# Patient Record
Sex: FEMALE | Race: ASIAN | NOT HISPANIC OR LATINO
[De-identification: names, ages, dates, MRNs, and addresses within clinical notes are randomized per-mention and may not be internally consistent; named-entity substitution may affect disease eponyms.]

---

## 2020-07-08 ENCOUNTER — APPOINTMENT (OUTPATIENT)
Dept: NEUROLOGY | Facility: CLINIC | Age: 75
End: 2020-07-08
Payer: MEDICARE

## 2020-07-08 VITALS
DIASTOLIC BLOOD PRESSURE: 75 MMHG | SYSTOLIC BLOOD PRESSURE: 119 MMHG | OXYGEN SATURATION: 98 % | TEMPERATURE: 98 F | HEART RATE: 70 BPM | WEIGHT: 90 LBS | HEIGHT: 62 IN | BODY MASS INDEX: 16.56 KG/M2

## 2020-07-08 DIAGNOSIS — Z85.3 PERSONAL HISTORY OF MALIGNANT NEOPLASM OF BREAST: ICD-10-CM

## 2020-07-08 DIAGNOSIS — Z87.39 PERSONAL HISTORY OF OTHER DISEASES OF THE MUSCULOSKELETAL SYSTEM AND CONNECTIVE TISSUE: ICD-10-CM

## 2020-07-08 DIAGNOSIS — I83.93 ASYMPTOMATIC VARICOSE VEINS OF BILATERAL LOWER EXTREMITIES: ICD-10-CM

## 2020-07-08 DIAGNOSIS — Z86.69 PERSONAL HISTORY OF OTHER DISEASES OF THE NERVOUS SYSTEM AND SENSE ORGANS: ICD-10-CM

## 2020-07-08 DIAGNOSIS — Z78.9 OTHER SPECIFIED HEALTH STATUS: ICD-10-CM

## 2020-07-08 DIAGNOSIS — Z80.3 FAMILY HISTORY OF MALIGNANT NEOPLASM OF BREAST: ICD-10-CM

## 2020-07-08 DIAGNOSIS — Z82.49 FAMILY HISTORY OF ISCHEMIC HEART DISEASE AND OTHER DISEASES OF THE CIRCULATORY SYSTEM: ICD-10-CM

## 2020-07-08 DIAGNOSIS — Z86.59 PERSONAL HISTORY OF OTHER MENTAL AND BEHAVIORAL DISORDERS: ICD-10-CM

## 2020-07-08 PROBLEM — Z00.00 ENCOUNTER FOR PREVENTIVE HEALTH EXAMINATION: Status: ACTIVE | Noted: 2020-07-08

## 2020-07-08 PROCEDURE — 99205 OFFICE O/P NEW HI 60 MIN: CPT

## 2020-07-08 RX ORDER — IRBESARTAN 150 MG/1
150 TABLET ORAL
Refills: 0 | Status: ACTIVE | COMMUNITY

## 2020-07-08 RX ORDER — CHOLECALCIFEROL (VITAMIN D3) 25 MCG
TABLET ORAL
Refills: 0 | Status: ACTIVE | COMMUNITY

## 2020-07-08 RX ORDER — ASPIRIN 325 MG
TABLET ORAL
Refills: 0 | Status: ACTIVE | COMMUNITY

## 2020-07-08 RX ORDER — OLOPATADINE HYDROCHLORIDE 7 MG/ML
0.7 SOLUTION OPHTHALMIC
Refills: 0 | Status: ACTIVE | COMMUNITY

## 2020-07-08 RX ORDER — COLESEVELAM HYDROCHLORIDE 3.75 G/1
3.75 POWDER, FOR SUSPENSION ORAL
Refills: 0 | Status: ACTIVE | COMMUNITY

## 2020-07-08 RX ORDER — LATANOPROST/PF 0.005 %
0.01 DROPS OPHTHALMIC (EYE)
Refills: 0 | Status: ACTIVE | COMMUNITY

## 2020-07-08 RX ORDER — FOLIC ACID 1 MG/1
1 TABLET ORAL
Refills: 0 | Status: ACTIVE | COMMUNITY

## 2020-07-08 NOTE — REVIEW OF SYSTEMS
[As Noted in HPI] : as noted in HPI [Eyesight Problems] : eyesight problems [Joint Pain] : joint pain [Fever] : no fever [Anxiety] : no anxiety [Chest Pain] : no chest pain [Loss Of Hearing] : no hearing loss [Abdominal Pain] : no abdominal pain [Cough] : no cough [Itching] : no itching [Incontinence] : no incontinence [Muscle Weakness] : no muscle weakness [Easy Bleeding] : no tendency for easy bleeding

## 2020-07-08 NOTE — ASSESSMENT
[FreeTextEntry1] : Memory problems in patient with breast cancer and h/o hyponatremia, the symptoms are concerning for cognitive impairment but given her history should rule out metastasis from breast CA, metabolic dysfunction and other causes of reversible causes of dementia.  WIll check basic labs, labs for reversible cases of dementia and MRI brain with and w/o edgar.

## 2020-07-08 NOTE — HISTORY OF PRESENT ILLNESS
[FreeTextEntry1] : The patient is here for memory problems which started few years ago as per the son but were noted to be significantly worse over the past few  months with the COVID19 pandemic.  The patient has been also admitted to [facility] with worsening memory in the setting of hyponatremia.  Her imaging showed some concern for possible metastasis to the spine, patient has h/o of breast cancer, but MRI of the spine is reported to be normal as pe rthe son.  The patient was dx with breast cancer 7-8 years ago and has had normal mammograms as per the son.\par \par The patient lives with the son.  She also to get groomed, eat and do some cleaning by herself.  She has forgotten things on the stove.  She does not do the finances, has never done so.  She does go outside without any known times of getting lost but she has gotten confused.  She does not have any bowel or bladder problems, no problems with walking or mood.

## 2020-07-09 LAB
ALBUMIN SERPL ELPH-MCNC: 4.8 G/DL
ALP BLD-CCNC: 47 U/L
ALT SERPL-CCNC: 25 U/L
ANION GAP SERPL CALC-SCNC: 14 MMOL/L
AST SERPL-CCNC: 54 U/L
BASOPHILS # BLD AUTO: 0.04 K/UL
BASOPHILS NFR BLD AUTO: 0.6 %
BILIRUB SERPL-MCNC: 0.6 MG/DL
BUN SERPL-MCNC: 21 MG/DL
CALCIUM SERPL-MCNC: 9.7 MG/DL
CHLORIDE SERPL-SCNC: 97 MMOL/L
CO2 SERPL-SCNC: 24 MMOL/L
CREAT SERPL-MCNC: 0.79 MG/DL
EOSINOPHIL # BLD AUTO: 0.05 K/UL
EOSINOPHIL NFR BLD AUTO: 0.7 %
FOLATE SERPL-MCNC: >20 NG/ML
GLUCOSE SERPL-MCNC: 83 MG/DL
HCT VFR BLD CALC: 41.1 %
HGB BLD-MCNC: 13.9 G/DL
IMM GRANULOCYTES NFR BLD AUTO: 0.3 %
LYMPHOCYTES # BLD AUTO: 1.86 K/UL
LYMPHOCYTES NFR BLD AUTO: 26.8 %
MAN DIFF?: NORMAL
MCHC RBC-ENTMCNC: 33.3 PG
MCHC RBC-ENTMCNC: 33.8 GM/DL
MCV RBC AUTO: 98.6 FL
MONOCYTES # BLD AUTO: 0.33 K/UL
MONOCYTES NFR BLD AUTO: 4.7 %
NEUTROPHILS # BLD AUTO: 4.65 K/UL
NEUTROPHILS NFR BLD AUTO: 66.9 %
PLATELET # BLD AUTO: 251 K/UL
POTASSIUM SERPL-SCNC: 4.8 MMOL/L
PROT SERPL-MCNC: 7.6 G/DL
RBC # BLD: 4.17 M/UL
RBC # FLD: 13.5 %
SODIUM SERPL-SCNC: 134 MMOL/L
T PALLIDUM AB SER QL IA: NEGATIVE
T3 SERPL-MCNC: 93 NG/DL
T4 SERPL-MCNC: 8.6 UG/DL
TSH SERPL-ACNC: 1.63 UIU/ML
VIT B12 SERPL-MCNC: 1337 PG/ML
WBC # FLD AUTO: 6.95 K/UL

## 2020-07-14 LAB — METHYLMALONATE SERPL-SCNC: 277 NMOL/L

## 2020-08-26 ENCOUNTER — APPOINTMENT (OUTPATIENT)
Dept: NEUROLOGY | Facility: CLINIC | Age: 75
End: 2020-08-26
Payer: MEDICARE

## 2020-08-26 VITALS
HEIGHT: 62 IN | OXYGEN SATURATION: 98 % | TEMPERATURE: 97.6 F | DIASTOLIC BLOOD PRESSURE: 80 MMHG | WEIGHT: 110 LBS | SYSTOLIC BLOOD PRESSURE: 126 MMHG | HEART RATE: 76 BPM | BODY MASS INDEX: 20.24 KG/M2

## 2020-08-26 PROCEDURE — 99214 OFFICE O/P EST MOD 30 MIN: CPT

## 2020-08-26 NOTE — HISTORY OF PRESENT ILLNESS
[FreeTextEntry1] : The patient is here for memory problems which started few years ago as per the son but were noted to be significantly worse over the past few months with the COVID19 pandemic. The patient has been also admitted to [facility] with worsening memory in the setting of hyponatremia. Her imaging showed some concern for possible metastasis to the spine, patient has h/o of breast cancer, but MRI of the spine is reported to be normal as pe rthe son. The patient was dx with breast cancer 7-8 years ago and has had normal mammograms as per the son.\par \par The patient lives with the son. She also to get groomed, eat and do some cleaning by herself. She has forgotten things on the stove. She does not do the finances, has never done so. She does go outside without any known times of getting lost but she has gotten confused. She does not have any bowel or bladder problems, no problems with walking or mood. \par \par Memory has worsening since last visit.

## 2020-08-26 NOTE — ASSESSMENT
[FreeTextEntry1] : Memory problems in patient with breast cancer and h/o hyponatremia, the symptoms are concerning for cognitive impairment due to Alzheimers.  Will start Aricept 5mg.  Also, encouraged Mediterranean diet, physical/ social and intellectual stimulation.

## 2020-08-26 NOTE — DATA REVIEWED
[de-identified] : wmid [de-identified] : labs for reversible causes of dementia unremarkable, ast slightly elevated (pt will fu with PMD)

## 2020-08-26 NOTE — PHYSICAL EXAM
[General Appearance - Alert] : alert [General Appearance - In No Acute Distress] : in no acute distress [Person] : oriented to person [Place] : oriented to place [Registration Intact] : recent registration memory intact [Time] : disoriented to time [Concentration Intact] : normal concentrating ability [Naming Objects] : no difficulty naming common objects [Fluency] : fluency intact [Repeating Phrases] : no difficulty repeating a phrase [Comprehension] : comprehension intact [Vocabulary] : adequate range of vocabulary [Cranial Nerves Oculomotor (III)] : extraocular motion intact [Motor Tone] : muscle tone was normal in all four extremities [Motor Strength] : muscle strength was normal in all four extremities [Abnormal Walk] : normal gait [Balance] : balance was intact

## 2020-11-30 ENCOUNTER — APPOINTMENT (OUTPATIENT)
Dept: NEUROLOGY | Facility: CLINIC | Age: 75
End: 2020-11-30
Payer: MEDICARE

## 2020-11-30 VITALS
TEMPERATURE: 98 F | HEART RATE: 82 BPM | WEIGHT: 114 LBS | BODY MASS INDEX: 20.98 KG/M2 | DIASTOLIC BLOOD PRESSURE: 68 MMHG | OXYGEN SATURATION: 98 % | SYSTOLIC BLOOD PRESSURE: 115 MMHG | HEIGHT: 62 IN

## 2020-11-30 PROCEDURE — 99213 OFFICE O/P EST LOW 20 MIN: CPT

## 2020-11-30 NOTE — HISTORY OF PRESENT ILLNESS
[FreeTextEntry1] : The patient is here for memory problems which started few years ago as per the son but were noted to be significantly worse over the past few months with the COVID19 pandemic. The patient has been also admitted to [facility] with worsening memory in the setting of hyponatremia. Her imaging showed some concern for possible metastasis to the spine, patient has h/o of breast cancer, but MRI of the spine is reported to be normal as pe rthe son. The patient was dx with breast cancer 7-8 years ago and has had normal mammograms as per the son.\par \par The patient lives with the son. She also to get groomed, eat and do some cleaning by herself. She has forgotten things on the stove. She does not do the finances, has never done so. She does go outside without any known times of getting lost but she has gotten confused. She does not have any bowel or bladder problems, no problems with walking or mood. \par \par No clinical change since last visit. \par

## 2020-11-30 NOTE — ASSESSMENT
[FreeTextEntry1] : Memory problems in patient with breast cancer and h/o hyponatremia, the symptoms are concerning for cognitive impairment due to Alzheimers. Will increase Aricept 5mg to 10mg. Also, encouraged Mediterranean diet, physical/ social and intellectual stimulation. \par

## 2020-11-30 NOTE — PHYSICAL EXAM
[General Appearance - Alert] : alert [General Appearance - In No Acute Distress] : in no acute distress [Person] : oriented to person [Place] : oriented to place [Time] : disoriented to time [Concentration Intact] : normal concentrating ability [Naming Objects] : no difficulty naming common objects [Repeating Phrases] : no difficulty repeating a phrase [Fluency] : fluency intact [Comprehension] : comprehension intact [Cranial Nerves Optic (II)] : visual acuity intact bilaterally,  visual fields full to confrontation, pupils equal round and reactive to light [Cranial Nerves Oculomotor (III)] : extraocular motion intact [Cranial Nerves Trigeminal (V)] : facial sensation intact symmetrically [Cranial Nerves Facial (VII)] : face symmetrical [Motor Tone] : muscle tone was normal in all four extremities [Motor Strength] : muscle strength was normal in all four extremities [Sensation Tactile Decrease] : light touch was intact [Abnormal Walk] : normal gait [Balance] : balance was intact

## 2021-03-15 ENCOUNTER — APPOINTMENT (OUTPATIENT)
Dept: NEUROLOGY | Facility: CLINIC | Age: 76
End: 2021-03-15
Payer: MEDICARE

## 2021-03-15 VITALS
SYSTOLIC BLOOD PRESSURE: 157 MMHG | HEART RATE: 86 BPM | OXYGEN SATURATION: 98 % | WEIGHT: 114 LBS | DIASTOLIC BLOOD PRESSURE: 75 MMHG | TEMPERATURE: 97.3 F | HEIGHT: 62 IN | BODY MASS INDEX: 20.98 KG/M2

## 2021-03-15 PROCEDURE — 99072 ADDL SUPL MATRL&STAF TM PHE: CPT

## 2021-03-15 PROCEDURE — 99213 OFFICE O/P EST LOW 20 MIN: CPT

## 2021-03-15 RX ORDER — DONEPEZIL HYDROCHLORIDE 5 MG/1
5 TABLET ORAL
Qty: 30 | Refills: 5 | Status: DISCONTINUED | COMMUNITY
Start: 2020-08-26 | End: 2021-03-15

## 2021-03-15 NOTE — PHYSICAL EXAM
[General Appearance - Alert] : alert [General Appearance - In No Acute Distress] : in no acute distress [Person] : oriented to person [Place] : oriented to place [Time] : oriented to time [Concentration Intact] : normal concentrating ability [Naming Objects] : no difficulty naming common objects [Fluency] : fluency intact [Comprehension] : comprehension intact [Vocabulary] : adequate range of vocabulary [Abnormal Walk] : normal gait [Balance] : balance was intact

## 2021-03-15 NOTE — ASSESSMENT
[FreeTextEntry1] : Memory problems in patient with breast cancer and h/o hyponatremia, the symptoms are concerning for cognitive impairment due to Alzheimers. Will cw Aricept 10mg. Also, encouraged Mediterranean diet, physical/ social and intellectual stimulation. \par \par HTN, will fu with PMD\par

## 2021-03-15 NOTE — HISTORY OF PRESENT ILLNESS
[FreeTextEntry1] : The patient is here for memory problems which started few years ago as per the son but were noted to be significantly worse over the past few months with the COVID19 pandemic. The patient has been also admitted to [facility] with worsening memory in the setting of hyponatremia. Her imaging showed some concern for possible metastasis to the spine, patient has h/o of breast cancer, but MRI of the spine is reported to be normal as pe rthe son. The patient was dx with breast cancer 7-8 years ago and has had normal mammograms as per the son.\par \par The patient lives with the son. She also to get groomed, eat and do some cleaning by herself. She has forgotten things on the stove. She does not do the finances, has never done so. She does go outside without any known times of getting lost but she has gotten confused. She does not have any bowel or bladder problems, no problems with walking or mood. \par \par The patient was started on Aricept and increased to 10mg, patient tolerating the medications well without any problems.  She is with psychotherapy and is getting help with the memory with games/ puzzles.\par

## 2021-09-15 ENCOUNTER — APPOINTMENT (OUTPATIENT)
Dept: NEUROLOGY | Facility: CLINIC | Age: 76
End: 2021-09-15
Payer: MEDICARE

## 2021-09-15 VITALS
SYSTOLIC BLOOD PRESSURE: 117 MMHG | OXYGEN SATURATION: 98 % | DIASTOLIC BLOOD PRESSURE: 74 MMHG | TEMPERATURE: 97.3 F | HEART RATE: 74 BPM | HEIGHT: 62 IN | BODY MASS INDEX: 20.98 KG/M2 | WEIGHT: 114 LBS

## 2021-09-15 PROCEDURE — 99213 OFFICE O/P EST LOW 20 MIN: CPT

## 2021-09-15 NOTE — HISTORY OF PRESENT ILLNESS
[FreeTextEntry1] : The patient is here for memory problems which started few years ago as per the son but were noted to be significantly worse over the past few months with the COVID19 pandemic. The patient has been also admitted to [facility] with worsening memory in the setting of hyponatremia. Her imaging showed some concern for possible metastasis to the spine, patient has h/o of breast cancer, but MRI of the spine is reported to be normal as per the son. The patient was dx with breast cancer 7-8 years ago and has had normal mammograms as per the son.\par \par The patient lives with the son. She also to get groomed, eat and do some cleaning by herself. She has forgotten things on the stove. She does not do the finances, has never done so. She does go outside without any known times of getting lost but she has gotten confused. She does not have any bowel or bladder problems, no problems with walking or mood. \par \par The patient was started on Aricept and increased to 10mg, patient tolerating the medications well without any problems. She is with psychotherapy and is getting help with the memory with games/ puzzles.  Sicne last visit, the patient is getting HHA 6 hours per day as well.\par

## 2021-09-15 NOTE — ASSESSMENT
[FreeTextEntry1] : Memory problems in patient with breast cancer and h/o hyponatremia, the symptoms are concerning for mild cognitive impairment, patient initially scored MMSE of 25/30 in 2020 and the MMSE score 9/15/2021 is 27/30 which is in the normal range.  The patient is doing well and tolerating the Aricept well without any problems.  The patient is also on Mirtazapine, dose unknown, the son will obtain information on the medication dose in order to send Rx for it.  Advised patient's son on the importance of Mediterranean diet, 120min/ week of aerobic activity and social and intellectual stimulation in maintaining good cognitive reserve.\par \par I spent the time noted on the day of this patient encounter preparing for, providing and documenting the above E/M service and counseling and educate patient on differential, workup, disease course, and treatment/management. Education was provided to the patient during this encounter. All questions and concerns were answered and addressed in detail. The patient verbalized understanding and agreed to plan. Patient was advised to continue to monitor for neurologic symptoms and to notify my office or go to the nearest emergency room if there are any changes. Any orders/referrals and communications were provided as well. \par Side effects of the above medications were discussed in detail including but not limited to applicable black box warning and teratogenicity as appropriate. \par Patient was advised to bring previous records to my office, including CD of imaging, when applicable. \par \par

## 2021-09-15 NOTE — PHYSICAL EXAM
[General Appearance - Alert] : alert [General Appearance - In No Acute Distress] : in no acute distress [Person] : oriented to person [Place] : oriented to place [Time] : oriented to time [Concentration Intact] : normal concentrating ability [Naming Objects] : no difficulty naming common objects [Fluency] : fluency intact [Comprehension] : comprehension intact [Vocabulary] : adequate range of vocabulary [Total Score ___ / 30] : the patient achieved a score of [unfilled] /30 [Place ___ / 5] : place [unfilled] / 5 [Registration ___ / 3] : registration [unfilled] / 3 [Serial Sevens ___/5] : serial sevens [unfilled] / 5 [Naming 2 Objects ___ / 2] : naming two objects [unfilled] / 2 [Repeating a Sentence ___ / 1] : repeating a sentence [unfilled] / 1 [Writing a Sentence ___ / 1] : write sentence [unfilled] / 1 [3-stage Verbal Command ___ / 3] : three-stage verbal command [unfilled] / 3 [Written Command ___ / 1] : written command [unfilled] / 1 [Copy a Design ___ / 1] : copy a design [unfilled] / 1 [Recall ___ / 3] : recall [unfilled] / 3 [Cranial Nerves Optic (II)] : visual acuity intact bilaterally,  visual fields full to confrontation, pupils equal round and reactive to light [Cranial Nerves Oculomotor (III)] : extraocular motion intact [Cranial Nerves Trigeminal (V)] : facial sensation intact symmetrically [Cranial Nerves Facial (VII)] : face symmetrical [Motor Tone] : muscle tone was normal in all four extremities [Motor Strength] : muscle strength was normal in all four extremities [Sensation Tactile Decrease] : light touch was intact [Abnormal Walk] : normal gait [Balance] : balance was intact [Short Term Intact] : short term memory impaired [Coordination - Dysmetria Impaired Finger-to-Nose Bilateral] : not present [Coordination - Dysmetria Impaired Heel-to-Shin Bilateral] : not present [FreeTextEntry4] : MMSE 27/30

## 2022-03-16 ENCOUNTER — RX RENEWAL (OUTPATIENT)
Age: 77
End: 2022-03-16

## 2022-03-23 ENCOUNTER — APPOINTMENT (OUTPATIENT)
Dept: NEUROLOGY | Facility: CLINIC | Age: 77
End: 2022-03-23
Payer: MEDICARE

## 2022-03-23 VITALS
HEART RATE: 74 BPM | OXYGEN SATURATION: 97 % | TEMPERATURE: 97.6 F | SYSTOLIC BLOOD PRESSURE: 112 MMHG | BODY MASS INDEX: 23.92 KG/M2 | DIASTOLIC BLOOD PRESSURE: 62 MMHG | HEIGHT: 62 IN | WEIGHT: 130 LBS

## 2022-03-23 PROCEDURE — 99213 OFFICE O/P EST LOW 20 MIN: CPT

## 2022-03-23 NOTE — ASSESSMENT
[FreeTextEntry1] : Memory problems in patient with breast cancer and h/o hyponatremia, the symptoms are concerning for mild cognitive impairment, patient initially scored MMSE of 25/30 in 2020 and the MMSE score 9/15/2021 is 27/30 which is in the normal range. The patient is doing well and tolerating the Aricept well without any problems. The patient is also on Mirtazapine, dose unknown, the son will obtain information on the medication dose in order to send Rx for it. Advised patient's son on the importance of Mediterranean diet, 120min/ week of aerobic activity and social and intellectual stimulation in maintaining good cognitive reserve.\par \par I spent the time noted on the day of this patient encounter preparing for, providing and documenting the above E/M service and counseling and educate patient on differential, workup, disease course, and treatment/management. Education was provided to the patient during this encounter. All questions and concerns were answered and addressed in detail. The patient verbalized understanding and agreed to plan. Patient was advised to continue to monitor for neurologic symptoms and to notify my office or go to the nearest emergency room if there are any changes. Any orders/referrals and communications were provided as well. \par Side effects of the above medications were discussed in detail including but not limited to applicable black box warning and teratogenicity as appropriate. \par Patient was advised to bring previous records to my office, including CD of imaging, when applicable. \par \par  \par \par

## 2022-03-23 NOTE — HISTORY OF PRESENT ILLNESS
[FreeTextEntry1] : The patient is here for memory problems which started few years ago as per the son but were noted to be significantly worse over the past few months with the COVID19 pandemic. The patient has been also admitted to [facility] with worsening memory in the setting of hyponatremia. Her imaging showed some concern for possible metastasis to the spine, patient has h/o of breast cancer, but MRI of the spine is reported to be normal as per the son. The patient was dx with breast cancer 7-8 years ago and has had normal mammograms as per the son.\par \par The patient lives with the son. She also to get groomed, eat and do some cleaning by herself. She has forgotten things on the stove. She does not do the finances, has never done so. She does go outside without any known times of getting lost but she has gotten confused. She does not have any bowel or bladder problems, no problems with walking or mood. \par \par The patient was started on Aricept and increased to 10mg, patient tolerating the medications well without any problems. She is with psychotherapy and is getting help with the memory with games/ puzzles. The patient is getting HHA 6 hours per day as well.\par \par

## 2022-03-23 NOTE — PHYSICAL EXAM
[General Appearance - Alert] : alert [General Appearance - In No Acute Distress] : in no acute distress [Person] : oriented to person [Place] : oriented to place [Time] : oriented to time [Concentration Intact] : a decrease in concentrating ability was observed [Naming Objects] : no difficulty naming common objects [Fluency] : fluency intact [Comprehension] : comprehension intact [Vocabulary] : adequate range of vocabulary [Abnormal Walk] : normal gait [Balance] : balance was intact

## 2022-09-16 ENCOUNTER — APPOINTMENT (OUTPATIENT)
Dept: NEUROLOGY | Facility: CLINIC | Age: 77
End: 2022-09-16

## 2022-09-16 VITALS
DIASTOLIC BLOOD PRESSURE: 85 MMHG | HEIGHT: 62 IN | OXYGEN SATURATION: 98 % | BODY MASS INDEX: 24.29 KG/M2 | WEIGHT: 132 LBS | HEART RATE: 73 BPM | TEMPERATURE: 98.4 F | SYSTOLIC BLOOD PRESSURE: 137 MMHG

## 2022-09-16 DIAGNOSIS — G31.84 MILD COGNITIVE IMPAIRMENT, SO STATED: ICD-10-CM

## 2022-09-16 PROCEDURE — 99214 OFFICE O/P EST MOD 30 MIN: CPT

## 2022-09-16 NOTE — PHYSICAL EXAM
[General Appearance - Alert] : alert [General Appearance - In No Acute Distress] : in no acute distress [Date / Time ___ / 5] : date / time [unfilled] / 5 [Place ___ / 5] : place [unfilled] / 5 [Total Score ___ / 30] : the patient achieved a score of [unfilled] /30 [Registration ___ / 3] : registration [unfilled] / 3 [Serial Sevens ___/5] : serial sevens [unfilled] / 5 [Naming 2 Objects ___ / 2] : naming two objects [unfilled] / 2 [Repeating a Sentence ___ / 1] : repeating a sentence [unfilled] / 1 [Writing a Sentence ___ / 1] : write sentence [unfilled] / 1 [3-stage Verbal Command ___ / 3] : three-stage verbal command [unfilled] / 3 [Written Command ___ / 1] : written command [unfilled] / 1 [Copy a Design ___ / 1] : copy a design [unfilled] / 1 [Recall ___ / 3] : recall [unfilled] / 3

## 2022-09-16 NOTE — HISTORY OF PRESENT ILLNESS
[FreeTextEntry1] : The patient is here for memory problems which started few years ago as per the son but were noted to be significantly worse over the past few months with the COVID19 pandemic. The patient has been also admitted to [facility] with worsening memory in the setting of hyponatremia. Her imaging showed some concern for possible metastasis to the spine, patient has h/o of breast cancer, but MRI of the spine is reported to be normal as per the son. The patient was dx with breast cancer 7-8 years ago and has had normal mammograms as per the son.\par \par The patient lives with the son. She also to get groomed, eat and do some cleaning by herself. She has forgotten things on the stove. She does not do the finances, has never done so. She does go outside without any known times of getting lost but she has gotten confused. She does not have any bowel or bladder problems, no problems with walking or mood. \par \par The patient was started on Aricept and increased to 10mg, patient tolerating the medications well without any problems. She is with psychotherapy and is getting help with the memory with games/ puzzles. The patient is getting HHA 6 hours per day as well.\par \par Memory fluctuates since last visit.\par

## 2022-09-16 NOTE — ASSESSMENT
[FreeTextEntry1] : Memory problems in patient with breast cancer and h/o hyponatremia, the symptoms are concerning for mild cognitive impairment, patient initially scored MMSE of 25/30 in 2020 and the MMSE score 9/15/2021 is 27/30 and 21/30 on 9/16/2022. The patient is doing well and tolerating the Aricept well without any problems but the memory loss in progressing. The patient is also on Mirtazapine, dose unknown, the son will obtain information on the medication dose in order to send Rx for it. Advised patient's son on the importance of Mediterranean diet, 120min/ week of aerobic activity and social and intellectual stimulation in maintaining good cognitive reserve.\par \par HTN, stable, fu with PMD\par \par I spent the time noted on the day of this patient encounter preparing for, providing and documenting the above E/M service and counseling and educate patient on differential, workup, disease course, and treatment/management. Education was provided to the patient during this encounter. All questions and concerns were answered and addressed in detail. The patient verbalized understanding and agreed to plan. Patient was advised to continue to monitor for neurologic symptoms and to notify my office or go to the nearest emergency room if there are any changes. Any orders/referrals and communications were provided as well. \par Side effects of the above medications were discussed in detail including but not limited to applicable black box warning and teratogenicity as appropriate. \par Patient was advised to bring previous records to my office, including CD of imaging, when applicable.

## 2023-03-24 ENCOUNTER — APPOINTMENT (OUTPATIENT)
Dept: NEUROLOGY | Facility: CLINIC | Age: 78
End: 2023-03-24
Payer: MEDICARE

## 2023-03-24 VITALS
TEMPERATURE: 97.4 F | HEIGHT: 62 IN | DIASTOLIC BLOOD PRESSURE: 69 MMHG | OXYGEN SATURATION: 97 % | SYSTOLIC BLOOD PRESSURE: 114 MMHG | BODY MASS INDEX: 24.11 KG/M2 | WEIGHT: 131 LBS | HEART RATE: 77 BPM

## 2023-03-24 PROCEDURE — 99214 OFFICE O/P EST MOD 30 MIN: CPT

## 2023-03-24 RX ORDER — MEMANTINE HYDROCHLORIDE 5 MG/1
5 TABLET, FILM COATED ORAL
Qty: 120 | Refills: 5 | Status: DISCONTINUED | COMMUNITY
Start: 2022-09-16 | End: 2023-03-24

## 2023-03-24 RX ORDER — DONEPEZIL HYDROCHLORIDE 10 MG/1
10 TABLET ORAL DAILY
Qty: 30 | Refills: 5 | Status: DISCONTINUED | COMMUNITY
Start: 2020-09-21 | End: 2023-03-24

## 2023-03-24 NOTE — PHYSICAL EXAM
[General Appearance - Alert] : alert [General Appearance - In No Acute Distress] : in no acute distress [Person] : oriented to person [Concentration Intact] : normal concentrating ability [Naming Objects] : no difficulty naming common objects [Fluency] : fluency intact [Comprehension] : comprehension intact

## 2023-03-24 NOTE — ASSESSMENT
[FreeTextEntry1] : Memory problems in patient with breast cancer and h/o hyponatremia, the symptoms are concerning for mild cognitive impairment, patient initially scored MMSE of 25/30 in 2020 and the MMSE score 9/15/2021 is 27/30 and 21/30 on 9/16/2022. The patient is doing well and tolerating the Aricept well without any problems but the memory loss in progressing. The patient is also on Mirtazapine 10 mg at night. \par \par Patient is also experiencing insomnia, will advised patient to try melatonin 1 to 5 mg at night in stepwise fashion on consistent basis to be given and regular sleep schedule time in the evening.  She could also try a glass of warm milk 1 hour before bedtime.  Both the melatonin and the warm milk can assist with sleep.\par \par Advised patient's son on the importance of Mediterranean diet, 120min/ week of aerobic activity and social and intellectual stimulation in maintaining good cognitive reserve.\par \par HTN, better controlled at this visit, patient will follow-up with PMD.\par \par I spent the time noted on the day of this patient encounter preparing for, providing and documenting the above E/M service and counseling and educate patient on differential, workup, disease course, and treatment/management. Education was provided to the patient during this encounter. All questions and concerns were answered and addressed in detail. The patient verbalized understanding and agreed to plan. Patient was advised to continue to monitor for neurologic symptoms and to notify my office or go to the nearest emergency room if there are any changes. Any orders/referrals and communications were provided as well. \par Side effects of the above medications were discussed in detail including but not limited to applicable black box warning and teratogenicity as appropriate. \par Patient was advised to bring previous records to my office, including CD of imaging, when applicable. \par \par

## 2023-03-24 NOTE — HISTORY OF PRESENT ILLNESS
[FreeTextEntry1] : The patient is here for memory problems which started few years ago as per the son but were noted to be significantly worse over the past few months with the COVID19 pandemic. The patient has been also admitted to [facility] with worsening memory in the setting of hyponatremia. Her imaging showed some concern for possible metastasis to the spine, patient has h/o of breast cancer, but MRI of the spine is reported to be normal as per the son. The patient was dx with breast cancer 7-8 years ago and has had normal mammograms as per the son.\par \par The patient lives with the son. She also to get groomed, eat and do some cleaning by herself. She has forgotten things on the stove. She does not do the finances, has never done so. She does go outside without any known times of getting lost but she has gotten confused. She does not have any bowel or bladder problems, no problems with walking or mood. \par \par The patient was started on Aricept and increased to 10mg, patient tolerating the medications well without any problems. She is with psychotherapy and is getting help with the memory with games/ puzzles. The patient is getting HHA 12 hours per day as well.\par \par She was started on Namenda but had nausea, confusion and headaches and was stopped.\par HAs trouble with sleeping.\par \par

## 2023-09-21 ENCOUNTER — NON-APPOINTMENT (OUTPATIENT)
Age: 78
End: 2023-09-21

## 2023-09-26 ENCOUNTER — APPOINTMENT (OUTPATIENT)
Dept: NEUROLOGY | Facility: CLINIC | Age: 78
End: 2023-09-26
Payer: MEDICARE

## 2023-09-26 VITALS
SYSTOLIC BLOOD PRESSURE: 154 MMHG | WEIGHT: 129 LBS | BODY MASS INDEX: 23.74 KG/M2 | DIASTOLIC BLOOD PRESSURE: 90 MMHG | TEMPERATURE: 97.7 F | HEART RATE: 75 BPM | OXYGEN SATURATION: 96 % | HEIGHT: 62 IN

## 2023-09-26 VITALS — DIASTOLIC BLOOD PRESSURE: 71 MMHG | SYSTOLIC BLOOD PRESSURE: 111 MMHG

## 2023-09-26 DIAGNOSIS — M25.512 PAIN IN RIGHT SHOULDER: ICD-10-CM

## 2023-09-26 DIAGNOSIS — M25.511 PAIN IN RIGHT SHOULDER: ICD-10-CM

## 2023-09-26 DIAGNOSIS — G89.29 PAIN IN RIGHT SHOULDER: ICD-10-CM

## 2023-09-26 DIAGNOSIS — I10 ESSENTIAL (PRIMARY) HYPERTENSION: ICD-10-CM

## 2023-09-26 PROCEDURE — 99215 OFFICE O/P EST HI 40 MIN: CPT

## 2023-11-16 ENCOUNTER — APPOINTMENT (OUTPATIENT)
Dept: NEUROLOGY | Facility: CLINIC | Age: 78
End: 2023-11-16
Payer: MEDICARE

## 2023-11-16 VITALS
OXYGEN SATURATION: 95 % | SYSTOLIC BLOOD PRESSURE: 135 MMHG | HEIGHT: 61 IN | BODY MASS INDEX: 24.55 KG/M2 | TEMPERATURE: 98.2 F | WEIGHT: 130 LBS | HEART RATE: 83 BPM | DIASTOLIC BLOOD PRESSURE: 85 MMHG

## 2023-11-16 VITALS — SYSTOLIC BLOOD PRESSURE: 122 MMHG | DIASTOLIC BLOOD PRESSURE: 78 MMHG

## 2023-11-16 PROCEDURE — 99215 OFFICE O/P EST HI 40 MIN: CPT

## 2023-11-30 ENCOUNTER — OUTPATIENT (OUTPATIENT)
Dept: OUTPATIENT SERVICES | Facility: HOSPITAL | Age: 78
LOS: 1 days | End: 2023-11-30
Payer: MEDICARE

## 2023-11-30 DIAGNOSIS — Z98.49 CATARACT EXTRACTION STATUS, UNSPECIFIED EYE: Chronic | ICD-10-CM

## 2023-11-30 DIAGNOSIS — Z98.89 OTHER SPECIFIED POSTPROCEDURAL STATES: Chronic | ICD-10-CM

## 2023-11-30 DIAGNOSIS — R41.0 DISORIENTATION, UNSPECIFIED: ICD-10-CM

## 2023-11-30 DIAGNOSIS — Z90.11 ACQUIRED ABSENCE OF RIGHT BREAST AND NIPPLE: Chronic | ICD-10-CM

## 2023-11-30 PROCEDURE — 95957 EEG DIGITAL ANALYSIS: CPT

## 2023-11-30 PROCEDURE — 95816 EEG AWAKE AND DROWSY: CPT

## 2023-11-30 PROCEDURE — 95816 EEG AWAKE AND DROWSY: CPT | Mod: 26

## 2023-11-30 NOTE — EEG REPORT - NS EEG TEXT BOX
BRAD HIDALGO [id number]     Study Date: 11-30-23  Duration: 30 min  --------------------------------------------------------------------------------------------------  History:  CC/ HPI Patient is a 78y old  Female who presents with a chief complaint of   MEDICATIONS  (STANDING):    --------------------------------------------------------------------------------------------------  Study Interpretation:    [[[Abbreviation Key:  PDR=alpha rhythm/posterior dominant rhythm. A-P=anterior posterior.  Amplitude: ‘very low’:<20; ‘low’:20-49; ‘medium’:; ‘high’:>150uV.  Persistence for periodic/rhythmic patterns (% of epoch) ‘rare’:<1%; ‘occasional’:1-10%; ‘frequent’:10-50%; ‘abundant’:50-90%; ‘continuous’:>90%.  Persistence for sporadic discharges: ‘rare’:<1/hr; ‘occasional’:1/min-1/hr; ‘frequent’:>1/min; ‘abundant’:>1/10 sec.  RPP=rhythmic and periodic patterns; GRDA=generalized rhythmic delta activity; FIRDA=frontal intermittent GRDA; LRDA=lateralized rhythmic delta activity; TIRDA=temporal intermittent rhythmic delta activity;  LPD=PLED=lateralized periodic discharges; GPD=generalized periodic discharges; BIPDs =bilateral independent periodic discharges; Mf=multifocal; SIRPDs=stimulus induced rhythmic, periodic, or ictal appearing discharges; BIRDs=brief potentially ictal rhythmic discharges >4 Hz, lasting .5-10s; PFA (paroxysmal bursts >13 Hz or =8 Hz <10s).  Modifiers: +F=with fast component; +S=with spike component; +R=with rhythmic component.  S-B=burst suppression pattern.  Max=maximal. N1-drowsy; N2-stage II sleep; N3-slow wave sleep. SSS/BETS=small sharp spikes/benign epileptiform transients of sleep. HV=hyperventilation; PS=photic stimulation]]]    Daily EEG Visual Analysis    FINDINGS:      Background:  Continuity: Continuous  Symmetry: Symmetric  Posterior dominant rhythm (PDR): 11 Hz, reactive to eye closure. Symmetric low-amplitude frontal beta in wakefulness.  State Change: Present  Voltage: Normal  Anterior-Posterior Gradient: Present  Other background findings: Diffuse low-amplitude beta activity.  Breach: Absent    Background Slowing:  Generalized slowing:   Focal slowing: Occasional bilateral independent frontotemporal focal polymorphic delta slowing    State Changes:   Drowsiness is characterized by fragmentation, attenuation, and slowing of the background activity.  Stage 2 sleep is characterized by symmetric vertex waves, K complexes and sleep spindles.     Interictal Findings:  Occasional left frontotemporal (F7/T7) spike/sharp-wave discharges.  Occasional right frontotemporal (F8) sharp-wave discharges, less prevalent and less well formed than on the left.  Rare right frontal (F4) sharply contoured waveform.    Electrographic and Electroclinical seizures:  None    Other Clinical Events:  None    Activation Procedures:   Hyperventilation is not performed.    Photic stimulation is performed and does not elicit any abnormalities.      Artifacts:  Intermittent myogenic and movement artifacts are present.    EKG:  Single-lead EKG shows regular rhythm.    EEG Classification / Summary:  Abnormal routine EEG in the awake, drowsy, and asleep states.   -Occasional left > right frontotemporal spike/sharp-wave discharges.  -Occasional bilateral independent frontotemporal focal slowing.  -No electrographic seizures are captured.     Clinical Impression:  -Risk of focal-onset seizures from the left > right frontotemporal regions  -Bilateral frontotemporal focal cerebral dysfunction can be structural or functional in etiology.             -------------------------------------------------------------------------------------------------------  [facility] EEG Reading Room Ph#: [phone]  Epilepsy Answering Service after 5PM and before 8:30AM: Ph#: [phone]    Mattie Hannah MD  Attending Physician, [facility]ilepsy [place]

## 2024-01-29 ENCOUNTER — APPOINTMENT (OUTPATIENT)
Dept: NEUROLOGY | Facility: CLINIC | Age: 79
End: 2024-01-29

## 2024-01-30 ENCOUNTER — APPOINTMENT (OUTPATIENT)
Dept: NEUROLOGY | Facility: CLINIC | Age: 79
End: 2024-01-30

## 2024-02-01 ENCOUNTER — APPOINTMENT (OUTPATIENT)
Dept: NEUROLOGY | Facility: CLINIC | Age: 79
End: 2024-02-01
Payer: MEDICARE

## 2024-02-01 DIAGNOSIS — R41.0 DISORIENTATION, UNSPECIFIED: ICD-10-CM

## 2024-02-01 PROCEDURE — 99443: CPT

## 2024-02-01 RX ORDER — CHOLECALCIFEROL (VITAMIN D3) 25 MCG
25 MCG CAPSULE ORAL
Qty: 30 | Refills: 0 | Status: ACTIVE | COMMUNITY
Start: 2023-07-19

## 2024-02-01 RX ORDER — ASPIRIN 81 MG/1
81 TABLET, COATED ORAL
Qty: 30 | Refills: 0 | Status: ACTIVE | COMMUNITY
Start: 2023-07-19

## 2024-02-01 NOTE — HISTORY OF PRESENT ILLNESS
[FreeTextEntry1] : The patient is here for memory problems which started few years ago as per the son but were noted to be significantly worse over the past few months with the COVID19 pandemic. The patient has been also admitted to [facility] with worsening memory in the setting of hyponatremia. Her imaging showed some concern for possible metastasis to the spine, patient has h/o of breast cancer, but MRI of the spine is reported to be normal as per the son. The patient was dx with breast cancer 7-8 years ago and has had normal mammograms as per the son.    The patient lives with the son. She also to get groomed, eat and do some cleaning by herself. She has forgotten things on the stove. She does not do the finances, has never done so. She does go outside without any known times of getting lost but she has gotten confused. She does not have any bowel or bladder problems, no problems with walking or mood.    The patient was started on Aricept and increased to 10mg, patient tolerating the medications well without any problems. She is with psychotherapy and is getting help with the memory with games/ puzzles. The patient is getting HHA 12 hours per day as well.    She was started on Namenda but had nausea, confusion and headaches and was stopped.  Patient has been since last visit, in terms of her memory. As per the son, the patient's mood has overall worsened and she is sometimes becomes aggressive and agitated, usually not severe. The patient does not want to get involved in social activities.  Patient is here for episode of confusion when after long walk. She had clinching of her hand at that time and was sweathing. SOn concerned for cva/ sz.

## 2024-02-01 NOTE — REASON FOR VISIT
[Home] : at home, [unfilled] , at the time of the educational consult. [Medical Office: ([facility])___] : at the medical office located in  [This encounter was initiated by telehealth (audio with video) and converted to telephone (audio only) due to technical difficulties.] : This encounter was initiated by telehealth (audio with video) and converted to telephone (audio only) due to technical difficulties. [Follow-Up: _____] : a [unfilled] follow-up visit

## 2024-02-01 NOTE — ASSESSMENT
[FreeTextEntry1] : Episode of confusion a few weeks ago, resolved and has not reoccurred unremarkable MRi brain, left> right frontotemporal sharp wave discharges on EEG, will refer to epilepsy  Memory problems in patient with breast cancer and h/o hyponatremia, the symptoms are concerning for mild cognitive impairment, patient initially scored MMSE of 25/30 in 2020 and the MMSE score 9/15/2021 is 27/30 and 21/30 on 9/16/2022. The patient is doing well and tolerating the Aricept well without any problems but the memory loss in progressing. The patient is also on Mirtazapine 15 mg at night. Patient's son declines increasing the nitrazepam for better mood and sleep control.  Patient is also experiencing insomnia, will advised patient to try melatonin 1 to 5 mg at night in stepwise fashion on consistent basis to be given and regular sleep schedule time in the evening. 1-3 tablets of 1 mg of melatonin at bedtime for sleep, patient's time with trying to increase to 3 mg at bedtime but incremental levels. She could also try a glass of warm milk 1 hour before bedtime. Both the melatonin and the warm milk can assist with sleep.  Advised patient's son on the importance of Mediterranean diet, 120min/ week of aerobic activity and social and intellectual stimulation in maintaining good cognitive reserve.  HTN, better controlled at this visit, patient will follow-up with PMD.  I spent the time noted on the day of this patient encounter preparing for, providing and documenting the above E/M service and counseling and educate patient on differential, workup, disease course, and treatment/management. Education was provided to the patient during this encounter. All questions and concerns were answered and addressed in detail. The patient verbalized understanding and agreed to plan. Patient was advised to continue to monitor for neurologic symptoms and to notify my office or go to the nearest emergency room if there are any changes. Any orders/referrals and communications were provided as well.  Side effects of the above medications were discussed in detail including but not limited to applicable black box warning and teratogenicity as appropriate.  Patient was advised to bring previous records to my office, including CD of imaging, when applicable.

## 2024-04-12 ENCOUNTER — APPOINTMENT (OUTPATIENT)
Dept: NEUROLOGY | Facility: CLINIC | Age: 79
End: 2024-04-12
Payer: MEDICARE

## 2024-04-12 DIAGNOSIS — G30.9 ALZHEIMER'S DISEASE, UNSPECIFIED: ICD-10-CM

## 2024-04-12 DIAGNOSIS — T17.308A UNSPECIFIED FOREIGN BODY IN LARYNX CAUSING OTHER INJURY, INITIAL ENCOUNTER: ICD-10-CM

## 2024-04-12 DIAGNOSIS — R25.9 UNSPECIFIED ABNORMAL INVOLUNTARY MOVEMENTS: ICD-10-CM

## 2024-04-12 DIAGNOSIS — R41.3 OTHER AMNESIA: ICD-10-CM

## 2024-04-12 DIAGNOSIS — F02.80 ALZHEIMER'S DISEASE, UNSPECIFIED: ICD-10-CM

## 2024-04-12 DIAGNOSIS — G47.00 INSOMNIA, UNSPECIFIED: ICD-10-CM

## 2024-04-12 PROCEDURE — 99443: CPT

## 2024-04-12 PROCEDURE — G2211 COMPLEX E/M VISIT ADD ON: CPT | Mod: NC,1L

## 2024-04-12 RX ORDER — MIRTAZAPINE 15 MG/1
15 TABLET, FILM COATED ORAL
Qty: 30 | Refills: 11 | Status: ACTIVE | COMMUNITY
Start: 2020-09-25 | End: 1900-01-01

## 2024-04-12 RX ORDER — LACTULOSE 10 G/15 ML
10 SOLUTION, ORAL ORAL
Refills: 0 | Status: DISCONTINUED | COMMUNITY
End: 2024-04-12

## 2024-04-12 RX ORDER — STANDARDIZED SENNA CONCENTRATE 8.6 MG/1
8.6 TABLET ORAL
Refills: 0 | Status: DISCONTINUED | COMMUNITY
End: 2024-04-12

## 2024-04-12 RX ORDER — DONEPEZIL HYDROCHLORIDE 10 MG/1
10 TABLET ORAL
Qty: 30 | Refills: 11 | Status: ACTIVE | COMMUNITY
Start: 2020-11-30 | End: 1900-01-01

## 2024-04-12 NOTE — HISTORY OF PRESENT ILLNESS
[FreeTextEntry1] : The patient is here for memory problems which started few years ago as per the son but were noted to be significantly worse over the past few months with the COVID19 pandemic. The patient has been also admitted to [facility] with worsening memory in the setting of hyponatremia. Her imaging showed some concern for possible metastasis to the spine, patient has h/o of breast cancer, but MRI of the spine is reported to be normal as per the son. The patient was dx with breast cancer 7-8 years ago and has had normal mammograms as per the son.  The patient lives on her own, has HHA and her son visits frequently. She needs help with grooming, cleaning, showering, most household and outdoor chores.  Needs help with eating, in terms of utensil use and chocking.  He uses pampers and pads when sleeps.  Uses cane due to toe fractures.    The patient was started on Aricept and increased to 10mg, patient tolerating the medications well without any problems. She is with psychotherapy and is getting help with the memory with games/ puzzles. The patient is getting HHA 12 hours per day as well.  She was started on Namenda but had nausea, confusion and headaches and was stopped.  Patient has been since last visit, in terms of her memory. As per the son, the patient's mood has overall worsened and she is sometimes becomes aggressive and agitated, usually not severe. The patient does not want to get involved in social activities.  Patient is here for episode of confusion when after long walk. She had clinching of her hand at that time and was sweathing. SOn concerned for cva/ sz.

## 2024-04-12 NOTE — ASSESSMENT
[FreeTextEntry1] : Episode of confusion a few weeks ago, resolved and has not reoccurred unremarkable MRi brain, left> right frontotemporal sharp wave discharges on EEG, will refer to epilepsy  Memory problems in patient with breast cancer and h/o hyponatremia, the symptoms are concerning for mild cognitive impairment, patient initially scored MMSE of 25/30 in 2020 and the MMSE score 9/15/2021 is 27/30 and 21/30 on 9/16/2022. The patient is doing well and tolerating the Aricept well without any problems but the memory loss in progressing. The patient is also on Mirtazapine 15 mg at night. Patient's son declines increasing the nitrazepam for better mood and sleep control.  Patient is also experiencing insomnia, will advised patient to try melatonin 1 to 5 mg at night in stepwise fashion on consistent basis to be given and regular sleep schedule time in the evening. 1-3 tablets of 1 mg of melatonin at bedtime for sleep, patient's time with trying to increase to 3 mg if needed at bedtime but incremental levels. She could also try a glass of warm milk 1 hour before bedtime. Both the melatonin and the warm milk can assist with sleep.  Advised patient's son on the importance of Mediterranean diet, 120min/ week of aerobic activity and social and intellectual stimulation in maintaining good cognitive reserve.  I spent the time noted on the day of this patient encounter preparing for, providing and documenting the above E/M service and counseling and educate patient on differential, workup, disease course, and treatment/management. Education was provided to the patient during this encounter. All questions and concerns were answered and addressed in detail. The patient verbalized understanding and agreed to plan. Patient was advised to continue to monitor for neurologic symptoms and to notify my office or go to the nearest emergency room if there are any changes. Any orders/referrals and communications were provided as well.  Side effects of the above medications were discussed in detail including but not limited to applicable black box warning and teratogenicity as appropriate.  Patient was advised to bring previous records to my office, including CD of imaging, when applicable.

## 2024-10-25 ENCOUNTER — RX RENEWAL (OUTPATIENT)
Age: 79
End: 2024-10-25

## 2025-04-29 ENCOUNTER — NON-APPOINTMENT (OUTPATIENT)
Age: 80
End: 2025-04-29

## 2025-04-29 ENCOUNTER — APPOINTMENT (OUTPATIENT)
Dept: NEUROLOGY | Facility: CLINIC | Age: 80
End: 2025-04-29
Payer: MEDICARE

## 2025-04-29 VITALS
BODY MASS INDEX: 25.68 KG/M2 | HEART RATE: 75 BPM | HEIGHT: 61 IN | DIASTOLIC BLOOD PRESSURE: 81 MMHG | WEIGHT: 136 LBS | OXYGEN SATURATION: 97 % | TEMPERATURE: 97.3 F | SYSTOLIC BLOOD PRESSURE: 153 MMHG

## 2025-04-29 DIAGNOSIS — R41.3 OTHER AMNESIA: ICD-10-CM

## 2025-04-29 DIAGNOSIS — G31.84 MILD COGNITIVE IMPAIRMENT, SO STATED: ICD-10-CM

## 2025-04-29 PROCEDURE — G2211 COMPLEX E/M VISIT ADD ON: CPT

## 2025-04-29 PROCEDURE — 99215 OFFICE O/P EST HI 40 MIN: CPT
